# Patient Record
Sex: FEMALE | Race: WHITE | NOT HISPANIC OR LATINO | Employment: OTHER | URBAN - METROPOLITAN AREA
[De-identification: names, ages, dates, MRNs, and addresses within clinical notes are randomized per-mention and may not be internally consistent; named-entity substitution may affect disease eponyms.]

---

## 2023-01-30 ENCOUNTER — HOSPITAL ENCOUNTER (INPATIENT)
Dept: DATA CONVERSION | Facility: HOSPITAL | Age: 68
Discharge: HOME | End: 2023-02-01
Attending: THORACIC SURGERY (CARDIOTHORACIC VASCULAR SURGERY) | Admitting: THORACIC SURGERY (CARDIOTHORACIC VASCULAR SURGERY)
Payer: MEDICARE

## 2023-01-30 DIAGNOSIS — I10 ESSENTIAL (PRIMARY) HYPERTENSION: ICD-10-CM

## 2023-01-30 DIAGNOSIS — E83.42 HYPOMAGNESEMIA: ICD-10-CM

## 2023-01-30 DIAGNOSIS — Z87.891 PERSONAL HISTORY OF NICOTINE DEPENDENCE: ICD-10-CM

## 2023-01-30 DIAGNOSIS — M19.90 UNSPECIFIED OSTEOARTHRITIS, UNSPECIFIED SITE: ICD-10-CM

## 2023-01-30 DIAGNOSIS — E66.01 MORBID (SEVERE) OBESITY DUE TO EXCESS CALORIES (MULTI): ICD-10-CM

## 2023-01-30 DIAGNOSIS — J84.10 PULMONARY FIBROSIS, UNSPECIFIED (MULTI): ICD-10-CM

## 2023-01-30 DIAGNOSIS — Z53.32 THORACOSCOPIC SURGICAL PROCEDURE CONVERTED TO OPEN PROCEDURE: ICD-10-CM

## 2023-01-30 LAB
PATIENT TEMPERATURE: 37 DEGREES C
POCT ANION GAP, ARTERIAL: 11 MMOL/L (ref 10–25)
POCT BASE EXCESS, ARTERIAL: 1 MMOL/L (ref -2–3)
POCT CHLORIDE, ARTERIAL: 101 MMOL/L (ref 98–107)
POCT GLUCOSE, ARTERIAL: 163 MG/DL (ref 74–99)
POCT HCO3, ARTERIAL: 26.6 MMOL/L (ref 22–26)
POCT HEMATOCRIT, ARTERIAL: 39 % (ref 36–46)
POCT HEMOGLOBIN, ARTERIAL: 12.9 G/DL (ref 12–16)
POCT IONIZED CALCIUM, ARTERIAL: 1.23 MMOL/L (ref 1.1–1.33)
POCT LACTATE, ARTERIAL: 1.2 MMOL/L (ref 0.4–2)
POCT OXY HEMOGLOBIN, ARTERIAL: 97.6 % (ref 94–98)
POCT PCO2, ARTERIAL: 45 MMHG (ref 38–42)
POCT PH, ARTERIAL: 7.38 (ref 7.38–7.42)
POCT PO2, ARTERIAL: 131 MMHG (ref 85–95)
POCT POTASSIUM, ARTERIAL: 4.4 MMOL/L (ref 3.5–5.3)
POCT SO2, ARTERIAL: 100 % (ref 94–100)
POCT SODIUM, ARTERIAL: 134 MMOL/L (ref 136–145)

## 2023-01-31 LAB
ACTIVATED PARTIAL THROMBOPLASTIN TIME IN PPP BY COAGULATION ASSAY: 27 SEC (ref 26–39)
ANION GAP IN SER/PLAS: 14 MMOL/L (ref 10–20)
CALCIUM (MG/DL) IN SER/PLAS: 9.7 MG/DL (ref 8.6–10.6)
CARBON DIOXIDE, TOTAL (MMOL/L) IN SER/PLAS: 29 MMOL/L (ref 21–32)
CHLORIDE (MMOL/L) IN SER/PLAS: 98 MMOL/L (ref 98–107)
CREATININE (MG/DL) IN SER/PLAS: 0.75 MG/DL (ref 0.5–1.05)
ERYTHROCYTE DISTRIBUTION WIDTH (RATIO) BY AUTOMATED COUNT: 14.3 % (ref 11.5–14.5)
ERYTHROCYTE MEAN CORPUSCULAR HEMOGLOBIN CONCENTRATION (G/DL) BY AUTOMATED: 30.9 G/DL (ref 32–36)
ERYTHROCYTE MEAN CORPUSCULAR VOLUME (FL) BY AUTOMATED COUNT: 88 FL (ref 80–100)
ERYTHROCYTES (10*6/UL) IN BLOOD BY AUTOMATED COUNT: 4.74 X10E12/L (ref 4–5.2)
GFR FEMALE: 86 ML/MIN/1.73M2
GLUCOSE (MG/DL) IN SER/PLAS: 175 MG/DL (ref 74–99)
HEMATOCRIT (%) IN BLOOD BY AUTOMATED COUNT: 41.7 % (ref 36–46)
HEMOGLOBIN (G/DL) IN BLOOD: 12.9 G/DL (ref 12–16)
INR IN PPP BY COAGULATION ASSAY: 1.1 (ref 0.9–1.1)
LEUKOCYTES (10*3/UL) IN BLOOD BY AUTOMATED COUNT: 11.8 X10E9/L (ref 4.4–11.3)
MAGNESIUM (MG/DL) IN SER/PLAS: 1.88 MG/DL (ref 1.6–2.4)
NRBC (PER 100 WBCS) BY AUTOMATED COUNT: 0 /100 WBC (ref 0–0)
PLATELETS (10*3/UL) IN BLOOD AUTOMATED COUNT: 185 X10E9/L (ref 150–450)
POTASSIUM (MMOL/L) IN SER/PLAS: 4.4 MMOL/L (ref 3.5–5.3)
PROTHROMBIN TIME (PT) IN PPP BY COAGULATION ASSAY: 12.4 SEC (ref 9.8–13.4)
SODIUM (MMOL/L) IN SER/PLAS: 137 MMOL/L (ref 136–145)
UREA NITROGEN (MG/DL) IN SER/PLAS: 13 MG/DL (ref 6–23)

## 2023-02-10 LAB
COMPLETE PATHOLOGY REPORT: NORMAL
CONVERTED CLINICAL DIAGNOSIS-HISTORY: NORMAL
CONVERTED FINAL DIAGNOSIS: NORMAL
CONVERTED FINAL REPORT PDF LINK TO COPY AND PASTE: NORMAL
CONVERTED GROSS DESCRIPTION: NORMAL
CONVERTED INTRAOPERATIVE DIAGNOSIS: NORMAL

## 2023-03-03 NOTE — H&P
History & Physical Reviewed:   I have reviewed the History and Physical dated:  30-Jan-2023   History and Physical reviewed and relevant findings noted. Patient examined to review pertinent physical  findings.: No significant changes   Home Medications Reviewed: no changes noted   Allergies Reviewed: no changes noted       ERAS (Enhanced Recovery After Surgery):  ·  ERAS Patient: no     Consent:   COVID-19 Consent:  ·  COVID-19 Risk Consent Surgeon has reviewed key risks related to the risk of sherri COVID-19 and if they contract COVID-19 what the risks are.     Attestation:   Note Completion:  I am a:  Resident/Fellow   Attending Attestation I saw and evaluated the patient.  I personally obtained the key and critical portions of the history and physical exam or was physically present for key and  critical portions performed by the resident/fellow. I reviewed the resident/fellow?s documentation and discussed the patient with the resident/fellow.  I agree with the resident/fellow?s medical decision making as documented in their note  with the exception/addition of the following:    I personally evaluated the patient on 30-Jan-2023   Comments/ Additional Findings    x          Electronic Signatures:  Sathish Cisneros (Fellow))  (Signed 30-Jan-2023 11:08)   Authored: History & Physical Reviewed, ERAS, Consent,  Note Completion  Aric Huffman)  (Signed 30-Jan-2023 14:36)   Authored: Note Completion   Co-Signer: History & Physical Reviewed, ERAS, Consent, Note Completion      Last Updated: 30-Jan-2023 14:36 by Aric Huffman)

## 2023-03-03 NOTE — DISCHARGE SUMMARY
Send Summary:   Discharge Summary Providers:  Provider Role Provider Name   · Attending Aric Huffman   · Referring Amando Ramirez   · Nurse  Practitioner Jae Virgen   · Primary Jessica Garza       Note Recipients: Jessica Garza MD - [id number]  []  Amando Ramirez MD       Discharge:    Summary:   Admission Date: .30-Jan-2023 08:34:00   Discharge Date: 01-Feb-2023   Attending Physician at Discharge: Aric Huffman   Admission Reason: lung nodule   Final Discharge Diagnoses: Lung nodule   Procedures: Date: 30-Jan-2023 16:48:00  Procedure Name: Flexible bronchoscopy, left robotic video assisted lower lobe wedge resection, mediastinal lymph node dissection, and rib blocks   Condition at Discharge: Satisfactory   Disposition at Discharge: .Home   Vital Signs:        T   P  R  BP   MAP  SpO2   Value  36.6  90  20  148/54      92%  Date/Time 2/1 5:39 2/1 5:39 2/1 5:39 2/1 5:39    2/1 5:39  Range  (36C - 37.6C )  (66 - 97 )  (17 - 20 )  (106 - 165 )/ (54 - 87 )    (92% - 97% )   As of 31-Jan-2023 09:00:00, patient is on 4 L/min of oxygen via high-flow nasal cannula.  Highest temp of 37.6 C was recorded at 1/31 10:00    Date:            Weight/Scale Type:  Height:   01-Feb-2023 01:54  111.8  kg              Hospital Course:    Pt is a 67 year old female with a past medical history of a left lower lung nodule, HTN, and OA who is s/p a flexible bronchoscopy, left robotic video assisted lower  lobe wedge resection, mediastinal lymph node dissection, and rib blocks with Dr. Huffman on 1/30/23. Intraop frozen pathology consistent with a granuloma. Postoperatively, the patient maintained a single left chest tube to waterseal. Chest tube with minimal  output and no air leak on POD#1. Chest tube subsequently removed without complications without complication, and an occlusive dressing was applied. A post pull CXR was obtained and stable.   Supplemental oxygen was weaned off and patient was saturating 92% on room air.  [id number] [facility]: Ms Aaron Umaña was seen today for nuchal translucency ultrasound  See ultrasound report under "OB Procedures" tab  Please don't hesitate to contact our office with any concerns or questions    Simin Rousseau MD Pain was well managed with oral regimen of Acetaminophen + Oxycodone.  Patient was deemed stable for discharge on 2/1/23 and was given the usual thoracic discharge instructions.  Official pathology results were pending on discharge.     Hospital day of discharge management - spent >30 minutes coordinating the discharge and counseling/educating patient and family regarding discharge instructions.        Discharge Information:    and Continuing Care:   Lab Results - Pending:    Surgical Pathology Drawn at 30-Jan-2023 15:57:00  Culture, Fungus + Fungus Stain Drawn at 30-Jan-2023 15:45:00  Culture, Acid Fast Bacilli, Misc.+ AFB Stain Drawn at 30-Jan-2023 15:45:00  Surgical Pathology Drawn at 30-Jan-2023 15:26:00  Radiology Results - Pending: Xray Chest 1 View at  01-Feb-2023 04:22:00   Discharge Instructions:    Activity:           Activity as tolerated          Continue increasing activity and using incentive spirometer, cough and deep breathe          No flying for 3 weeks          No jogging or heavy aerobics for 3 weeks          No driving while on pain medications (narcotics)          No pushing, pulling or lifting objects over 15 lbs for 3 weeks    Nutrition/Diet:           Regular    Wound Care:           Maintain occlusive dressing intact on chest tube sites for 48 hours after last chest tube removal, then remove. Apply dry band-aid if any oozing          May shower 48 hours after last chest tube removal          No swimming or bath tub until incisions well healed, 1 week          Cleanse incisions with soap and water daily. No dressing required; leave open to air. Do not use lotions, creams or tub soaks          Notify thoracic surgery of redness, increased drainage, bleeding or other problems    Infectious Disease:           PPD Status:   not given          MRSA:   no          VRE:   no          C. Diff:   no          Other Resistant Organism:   no          Isolation Type:   none    Follow Up Appointments:     Follow-Up - Chest Xray:           Physician/Dept/Service:   Chest Xray   Radiology          Scheduled Date/Time:   22-Feb-2023 10:15          Location:   [facility], [facility], 2nd Floor, Radiology Department          Phone Number:   [phone]    Follow-Up - Primary Care Physician:           Physician/Dept/Service:   Primary Care Physician   Dr. Garza          Call to Schedule in:   2 weeks    Follow-Up Appointment:           Physician/Dept/Service:   Dr. Huffman (Thoracic Surgery)          Scheduled Date/Time:   22-Feb-2023 10:45          Location:   [facility], [facility], First Floor, Desk B          Phone Number:   [phone]    Discharge Medications: Home Medication   acetaminophen 325 mg oral tablet - 2 tab(s) orally every 4 hours as needed for pain. over-the-counter   docusate sodium 100 mg oral tablet - 1 tab(s) orally 2 times a day as needed for constipation while on narcotics. over-the-counter   amLODIPine 5 mg oral tablet - 1 tab(s) oral once a day     PRN Medication   oxyCODONE 5 mg oral tablet - 1 tab(s) orally every 6 hours x 5 days, As Needed -Pain - Mod (4-6) - for breakthrough pain   zolpidem 5 mg oral tablet - 1 tab(s) orally once a day (at bedtime), As Needed     DNR Status:   ·  Code Status Code Status order at time of discharge: Full Code     Attestation:   Note Completion:  Provider/Team Pager # [phone]         Electronic Signatures:  Aurea Patrick (APRN-CNP)  (Signed 01-Feb-2023 08:32)   Authored: Send Summary, Summary Content, Ongoing Care,  DNR Status, Note Completion      Last Updated: 01-Feb-2023 08:32 by Aurea Patrick (APRN-CNP)

## 2023-03-03 NOTE — PROGRESS NOTES
Service: Thoracic & Esophageal Surgery     Subjective Data:   ROBERT PARKER is a 67 year old Female who is Hospital Day #2 and POD #1 for Flexible bronchoscopy, left robotic video assisted lower lobe wedge resection, mediastinal lymph node dissection, and rib  blocks.    Overnight Events: Patient had an uneventful night.   Additional Information:    Pt reports nausea overnight from her PCA. Otherwise, she has no concerns. Her pain is reasonably well controlled. She has ambulated in the halls with nursing. She  further denies shortness of breath, fevers, chills, or sweating, and her oxygen has been weaned to 4L/min nasal cannula. She has voided s/p godinez removal.    Objective Data:     Objective Information:      T   P  R  BP   MAP  SpO2   Value  37.6  97  17  165/86      96%  Date/Time 1/31 10:00 1/31 10:00 1/31 10:00 1/31 10:00    1/31 10:00  Range  (36.3C - 37.6C )  (78 - 97 )  (16 - 18 )  (106 - 165 )/ (61 - 86 )    (94% - 97% )   As of 31-Jan-2023 09:00:00, patient is on 4 L/min of oxygen via high-flow nasal cannula.  Highest temp of 37.6 C was recorded at 1/31 10:00      Pain reported at 1/31 6:57: 7 = Severe    ---- Intake and Output  -----  Mn/Dy/Year Time  Intake   Output  Net  Jan 31, 2023 6:00 am  530   200  330  Jan 30, 2023 10:00 pm  895   425  470    The Intake and Output Totals for the last 24 hours are:      Intake   Output  Net      [phone]    Physical Exam by System:    Constitutional: Alert and oriented x 3, NAD   Eyes: Moist mucus membranes   Head/Neck: No tracheal deviation or JVD   Respiratory/Thorax: No accessory muscle use to breathe.  On 4L/min nasal cannula. One left chest tube to atrium and watersealed with ~120cc of serosanguinous output since surgery. No air leak appreciated.   Cardiovascular: S1/S2 present and regular, rate in  the 70s on telemetry   Gastrointestinal: Protuberant abdomen   Extremities: No lower extremity edema or calf tenderness   Psychological:  Appropriate mood and behavior, very  pleasant   Skin: Surgical incisions well-approximated, clean,  dry, and intact. No incisional edema, erythema, or exudates.     Medication:    Medications:          Continuous Medications       --------------------------------  No continuous medications are active       Scheduled Medications       --------------------------------    1. Acetaminophen:  650  mg  Oral  Every 4 Hours    2. amLODIPine (NORVASC):  5  mg  Oral  Daily    3. Docusate 50 mg - Senna 8.6 m  tablet(s)  Oral  At Bedtime    4. Heparin SubCutaneous:  7500  unit(s)  SubCutaneous  Every 8 Hours         PRN Medications       --------------------------------    1. Albuterol 90 micrograms/ Inhalation MDI:  2  inhalation  Inhalation  Every 6 Hours    2. Naloxone Injectable:  0.2  mg  IntraVenous Push  Once    3. Ondansetron Injectable:  4  mg  IntraVenous Push  Every 6 Hours    4. oxyCODONE Immediate Release:  5  mg  Oral  Every 4 Hours    5. oxyCODONE Immediate Release:  10  mg  Oral  Every 4 Hours    6. Sore Throat Lozenge:  1  lozenge(s)  Oral  Every 3 Hours        Recent Lab Results:    Results:    CBC: 2023 08:17              \     Hgb     /                              \     12.9       /  WBC  ----------------  Plt               11.8 H    ----------------    185              /     Hct     \                              /     41.7       \            RBC: 4.74     MCV: 88           BMP: 2023 08:17  NA+        Cl-     BUN  /                         137    98    13  /  --------------------------------  Glucose                ---------------------------  175 H    K+     HCO3-   Creat \                         4.4  29    0.75  \  Calcium : 9.7     Anion Gap : 14      Coagulation: 2023 08:17  PT  /                    12.4  /  -------<    INR          ----------<      1.1  PTT\                    27  \                       ---------- Recent Arterial Blood Gas Results----------     2023  15:06  pO2 131  pH 7.38  pCO2 45  SO2 100  Base Excess 1.0null    Radiology Results:    Results:    Impression:  1.  Interval removal of left apical chest tube with similar-appearing  bibasilar atelectasis.  2.  Tracebilateral pleural effusions.  3. No other significant acute interval change since previous.  X-ray Chest 1 View [Jan 31 2023 11:21AM]    Impression:  1.  Left apical chest tube in place.  2. Interval improvement of aeration of bilateral lungs. Bibasilar  atelectasis.  3. Cardiomegaly, unchanged.  X-ray Chest 1 View [Jan 31 2023 11:20AM]    Impression:  1. Borderline heart size  2. Diminished pulmonary expansion.  3. Left thoracostomy tube.  4. Mild atelectasis or minimal infiltrate at either lung base.  X-ray Chest 1 View [Jan 31 2023 10:42AM]      Assessment and Plan:        Surg History:   Lung nodule: Entered Date: 26-Jan-2023 16:34    Comorbidities:  ·  Comorbidity obesity   ·  Obesity morbid obesity (BMI 40+)   ·  BMI 46.42     Code Status:  ·  Code Status Full Code     Advance Care Planning:  Advance Care Planning: I evaluated the patient  and determined the patient's capacity to understand the risks, benefits and alternatives to treatment. I elicited the patient's goals for treatment and reviewed advance directives and medical orders for life sustaining treatment. The patient was given  an opportunity to review a blank advance directive as appropriate.     Assessment:    Pt is a 67 year old female with a past medical history of a left lower lung nodule, HTN, and OA who is s/p a flexible bronchoscopy, left robotic video assisted lower  lobe wedge resection, mediastinal lymph node dissection, and rib blocks with Dr. Huffman on 1/30/23. Intraop frozen pathology consistent with a granuloma. Postoperatively, the patient maintained a single left chest tube to waterseal. Minimal serosanguinous  output and no air leak appreciated on POD#1. Chest tube was subsequently removed without complications, and an  occlusive dressing was applied. A post pull CXR was ordered.     Plan:    Neurology: Acute postop pain, well controlled   -Out of bed to the chair throughout the day  -Encourage ambulation as tolerated  -Discontinue PCA pump  -Begin oral regimen of pain control with oxycodone and Tylenol, monitor effectiveness, adjust dose as needed, OARRs checked for discharge planning, overdose risk score: 150     Cardiovascular: History of hypertension, on amlodipine at home. /86 this morning.   -Continue telemetry  -Vital signs every four hours  -Restart home amlodipine today  -Replace electrolytes as needed, hypomagnesia today, goal Mg>2, replaced    Pulmonology: History of left lower lung nodule who is s/p a flexible bronchoscopy, left robotic video assisted lower lobe wedge resection, mediastinal lymph node dissection, and rib blocks with Dr. Huffman on 1/30/23. Intraop frozen pathology consistent  with a granuloma. Minimal serosanguinous output and no air leak appreciated on POD#1.  -Encourage incentive spirometer use every hour  -Continue pulmonary hygiene  -Wean oxygen as tolerated (currently on 4L nasal cannula)   -Chest tube removed without complications, and an occlusive dressing was applied. A post pull CXR was ordered.   -Obtain daily CXR while hospitalized   -Nebulizers as needed for shortness of breath    Gastrointestinal:   -Begin regular diet  -Zofran available for nausea  -Bowel regimen available for constipation secondary to pain medication     Renal:   -Spontaneously voiding   -Continue to monitor daily electrolytes with routine BMPs     Infectious Disease: Afebrile, no leukocytosis   -Continue to trend daily temperatures and WBC count to monitor for signs of postoperative infection   -Monitor surgical incisions for signs of infection   -Perioperative Ancef completed     Hematology:   -Monitor for signs of acute blood loss  -Trend CBCs     Prophylaxis:   -Continue subcutaneous heparin and SCDs for DVT  prophylaxis    Disposition:  -Plan for discharge home once oxygen weaned and medically stable, ?late today vs. tomorrow  -Assess for home needs, pt reports good family support at home and declines need for PT/OT services   -I spent 15 minutes providing patient education on wound care, activity restrictions, and follow up care with the patient in preparation for discharge.     Patient seen and evaluated. I discussed the patient with Dr. Huffman.    Multidisciplinary Rounding:   The following staff  were in attendance attending physician and physician assistant. The following  topics were discussed during rounds activity, blood test results, diet, discharge planning, home care needs, imaging/procedure results, medications and plan of care.    Attestation:   Note Completion:  Provider/Team Pager # [phone]         Electronic Signatures:  Shadia Ramos (PAC)  (Signed 31-Jan-2023 12:48)   Authored: Service, Subjective Data, Objective Data, Assessment  and Plan, Multidisciplinary Rounding, Note Completion      Last Updated: 31-Jan-2023 12:48 by Shadia Ramos (PAC)

## 2023-03-03 NOTE — PROGRESS NOTES
Service: Thoracic & Esophageal Surgery     Subjective Data:   ROBERT PARKER is a 67 year old Female who is Hospital Day # 3 and POD #2 for Flexible bronchoscopy, left robotic video assisted lower lobe wedge resection, mediastinal lymph node dissection, and rib  blocks.    Overnight Events: Patient had an uneventful night.   Additional Information:    Pt reports sleepless night, woke up frequently mild incisional pain   oxygenating 92% on room air  ambulating hallway     Objective Data:     Objective Information:      T   P  R  BP   MAP  SpO2   Value  36.6  90  20  148/54      92%  Date/Time 2/1 5:39 2/1 5:39 2/1 5:39 2/1 5:39    2/1 5:39  Range  (36C - 37.6C )  (66 - 97 )  (17 - 20 )  (106 - 165 )/ (54 - 87 )    (92% - 97% )   As of 31-Jan-2023 09:00:00, patient is on 4 L/min of oxygen via high-flow nasal cannula.  Highest temp of 37.6 C was recorded at 1/31 10:00      Pain reported at 1/31 22:30: 5 = Moderate    ---- Intake and Output  -----  Mn/Dy/Year Time  Intake   Output  Net  Feb 1, 2023 6:00 am  0   0  0  Jan 31, 2023 10:00 pm  240   600  -360  Jan 31, 2023 2:00 pm  530   503  27    The Intake and Output Totals for the last 24 hours are:      Intake   Output  Net      770   1103  -333      T   P  R  BP   MAP  SpO2   Value  36.6  90  20  148/54      92%  Date/Time 2/1 5:39 2/1 5:39 2/1 5:39 2/1 5:39    2/1 5:39  Range  (36C - 37.6C )  (66 - 97 )  (17 - 20 )  (106 - 165 )/ (54 - 87 )    (92% - 97% )   As of 31-Jan-2023 09:00:00, patient is on 4 L/min of oxygen via high-flow nasal cannula.  Highest temp of 37.6 C was recorded at 1/31 10:00        Pain reported at 1/31 22:30: 5 = Moderate         Weights   2/1 1:54: Weight in kg (Weight (kg))  111.8  2/1 1:54: Weight in lbs ((lbs))  246.4  1/31 4:19: BMI (kg/m2) (BMI (kg/m2))  46.417      ---- Intake and Output  -----  Mn/Dy/Year Time  Intake   Output  Net  Feb 1, 2023 6:00 am  0   0  0  Jan 31, 2023 10:00 pm  240   600  -360  Jan 31, 2023 2:00  pm  530   277  27    The Intake and Output Totals for the last 24 hours are:      Intake   Output  Net      [phone] -477    Physical Exam by System:    Constitutional: Well developed, awake/alert/oriented  x3, no distress, alert and cooperative   Eyes: Moist mucus membranes   Head/Neck: No tracheal deviation or JVD   Respiratory/Thorax: oxygenating 92% on room air   diminished at bases   Cardiovascular: S1/S2 present and regular, rate in  the 70s on telemetry   Gastrointestinal: Protuberant abdomen, tolerating  diet well   Extremities: No lower extremity edema or calf tenderness   Psychological: Appropriate mood and behavior, very  pleasant   Skin: chest tube site with occlusive dressing intact  , no drainage     Medication:    Medications:          Continuous Medications       --------------------------------  No continuous medications are active       Scheduled Medications       --------------------------------    1. Acetaminophen:  650  mg  Oral  Every 4 Hours    2. amLODIPine (NORVASC):  5  mg  Oral  Daily    3. Docusate 50 mg - Senna 8.6 m  tablet(s)  Oral  At Bedtime    4. Heparin SubCutaneous:  7500  unit(s)  SubCutaneous  Every 8 Hours         PRN Medications       --------------------------------    1. Albuterol 90 micrograms/ Inhalation MDI:  2  inhalation  Inhalation  Every 6 Hours    2. Naloxone Injectable:  0.2  mg  IntraVenous Push  Once    3. Ondansetron Injectable:  4  mg  IntraVenous Push  Every 6 Hours    4. oxyCODONE Immediate Release:  5  mg  Oral  Every 4 Hours    5. oxyCODONE Immediate Release:  10  mg  Oral  Every 4 Hours    6. Sore Throat Lozenge:  1  lozenge(s)  Oral  Every 3 Hours        Recent Lab Results:    Results:    CBC: 2023 12:21              \     Hgb     /                              \     Canceled       /  WBC  ----------------  Plt               Canceled       ----------------    Canceled              /     Hct     \                              /     Canceled        \            RBC: Canceled     MCV: Canceled           BMP: 1/31/2023 12:21  NA+        Cl-     BUN  /                         Canceled    Canceled    Canceled  /  --------------------------------  Glucose                ---------------------------  Canceled    K+     HCO3-   Creat \                         Canceled  Canceled    Canceled  \  Calcium : Canceled     Anion Gap : Canceled      Coagulation: 1/31/2023 12:22  PT  /                    Canceled  /  -------<    INR          ----------<      Canceled  PTT\                    Canceled THE APTT IS NO LONGER USED FOR MONITORING     UNFRACTIONATED HEPARIN THERAPY.    FOR MONITORING HEPARIN THERAPY,     USE THE HEPARIN ASSAY.  \                       Radiology Results:    Results:    2/1/23 CXR   no sizable pneumothorax     Assessment and Plan:   Comorbidities:  ·  Comorbidity obesity   ·  Obesity morbid obesity (BMI 40+)   ·  BMI 46.42     Code Status:  ·  Code Status Full Code     Assessment:    Pt is a 67 year old female with a past medical history of a left lower lung nodule, HTN, and OA who is s/p a flexible bronchoscopy, left robotic video assisted lower  lobe wedge resection, mediastinal lymph node dissection, and rib blocks with Dr. Huffman on 1/30/23. Intraop frozen pathology consistent with a granuloma. Postoperatively, the patient maintained a single left chest tube to waterseal. Minimal serosanguinous  output and no air leak appreciated on POD#1. Chest tube was subsequently removed without complications, and an occlusive dressing was applied. A post pull CXR was stable without sizable pneumothorax.     Plan:    Neurology: Acute postop pain, well controlled   -Out of bed to the chair throughout the day  -Encourage ambulation as tolerated  -Good pain control on oral regimen of  oxycodone and Tylenol, monitor effectiveness, adjust dose as needed, OARRs checked for discharge planning, overdose risk score: 150   -Bowel regimen while on narcotics      Cardiovascular: History of hypertension, on amlodipine at home. Slightly hypertensive.   -Continue telemetry  -Vital signs every four hours  -Continue home amlodipine  -Replace electrolytes as needed    Pulmonology: History of left lower lung nodule who is s/p a flexible bronchoscopy, left robotic video assisted lower lobe wedge resection, mediastinal lymph node dissection, and rib blocks with Dr. Huffman on 1/30/23. Intraop frozen pathology consistent  with a granuloma. CT removed on POD#1.  -Encourage incentive spirometer use every hour  -Continue pulmonary hygiene  -Oxygenating 92% on room air   -Chest tube removed 1/31   -Obtain daily CXR while hospitalized   -Nebulizers as needed for shortness of breath  -Await pathology results     Gastrointestinal:   -Tolerating regular diet  -Zofran available for nausea  -Bowel regimen available for constipation secondary to pain medication     Renal:   -Spontaneously voiding   -Continue to monitor daily electrolytes with routine BMPs     Infectious Disease: Afebrile, no leukocytosis   -Continue to trend daily temperatures and WBC count to monitor for signs of postoperative infection   -Monitor surgical incisions for signs of infection   -Perioperative Ancef completed     Hematology:   -Monitor for signs of acute blood loss  -Trend CBCs     Prophylaxis:   -Continue subcutaneous heparin and SCDs for DVT prophylaxis    Disposition:  -Discharge patient home today, f/u with Dr. Huffman in 2 weeks with CXR    Patient seen and evaluated. I discussed the patient with Dr. Huffman.    Plan of Care Reviewed With:  Plan of Care Reviewed With: patient     Multidisciplinary Rounding:   The following staff  were in attendance attending physician.    Attestation:   Note Completion:  Provider/Team Pager # [phone]         Electronic Signatures:  Aurea Patrick (APRN-CNP)  (Signed 01-Feb-2023 08:08)   Authored: Service, Subjective Data, Objective Data, Assessment  and Plan,  Multidisciplinary Rounding, Note Completion      Last Updated: 01-Feb-2023 08:08 by Aurea Patrick (APRN-CNP)

## 2023-03-22 LAB
AFB CULTURE: NORMAL
AFB STAIN: NORMAL

## 2023-09-14 NOTE — PROGRESS NOTES
Service: Thoracic & Esophageal Surgery     Subjective Data:   ROBERT PARKER is a 67 year old Female who is Hospital Day # 3 and POD #2 for Flexible bronchoscopy, left robotic video assisted lower lobe wedge resection, mediastinal lymph node dissection, and rib  blocks.    Overnight Events: Patient had an uneventful night.   Additional Information:    Pt reports sleepless night, woke up frequently mild incisional pain   oxygenating 92% on room air  ambulating hallway     Objective Data:     Objective Information:      T   P  R  BP   MAP  SpO2   Value  36.6  90  20  148/54      92%  Date/Time 2/1 5:39 2/1 5:39 2/1 5:39 2/1 5:39    2/1 5:39  Range  (36C - 37.6C )  (66 - 97 )  (17 - 20 )  (106 - 165 )/ (54 - 87 )    (92% - 97% )   As of 31-Jan-2023 09:00:00, patient is on 4 L/min of oxygen via high-flow nasal cannula.  Highest temp of 37.6 C was recorded at 1/31 10:00      Pain reported at 1/31 22:30: 5 = Moderate    ---- Intake and Output  -----  Mn/Dy/Year Time  Intake   Output  Net  Feb 1, 2023 6:00 am  0   0  0  Jan 31, 2023 10:00 pm  240   600  -360  Jan 31, 2023 2:00 pm  530   503  27    The Intake and Output Totals for the last 24 hours are:      Intake   Output  Net      770   1103  -333      T   P  R  BP   MAP  SpO2   Value  36.6  90  20  148/54      92%  Date/Time 2/1 5:39 2/1 5:39 2/1 5:39 2/1 5:39    2/1 5:39  Range  (36C - 37.6C )  (66 - 97 )  (17 - 20 )  (106 - 165 )/ (54 - 87 )    (92% - 97% )   As of 31-Jan-2023 09:00:00, patient is on 4 L/min of oxygen via high-flow nasal cannula.  Highest temp of 37.6 C was recorded at 1/31 10:00        Pain reported at 1/31 22:30: 5 = Moderate         Weights   2/1 1:54: Weight in kg (Weight (kg))  111.8  2/1 1:54: Weight in lbs ((lbs))  246.4  1/31 4:19: BMI (kg/m2) (BMI (kg/m2))  46.417      ---- Intake and Output  -----  Mn/Dy/Year Time  Intake   Output  Net  Feb 1, 2023 6:00 am  0   0  0  Jan 31, 2023 10:00 pm  240   600  -360  Jan 31, 2023 2:00  pm  530   123  27    The Intake and Output Totals for the last 24 hours are:      Intake   Output  Net      449 6723 -464    Physical Exam by System:    Constitutional: Well developed, awake/alert/oriented  x3, no distress, alert and cooperative   Eyes: Moist mucus membranes   Head/Neck: No tracheal deviation or JVD   Respiratory/Thorax: oxygenating 92% on room air   diminished at bases   Cardiovascular: S1/S2 present and regular, rate in  the 70s on telemetry   Gastrointestinal: Protuberant abdomen, tolerating  diet well   Extremities: No lower extremity edema or calf tenderness   Psychological: Appropriate mood and behavior, very  pleasant   Skin: chest tube site with occlusive dressing intact  , no drainage     Medication:    Medications:          Continuous Medications       --------------------------------  No continuous medications are active       Scheduled Medications       --------------------------------    1. Acetaminophen:  650  mg  Oral  Every 4 Hours    2. amLODIPine (NORVASC):  5  mg  Oral  Daily    3. Docusate 50 mg - Senna 8.6 m  tablet(s)  Oral  At Bedtime    4. Heparin SubCutaneous:  7500  unit(s)  SubCutaneous  Every 8 Hours         PRN Medications       --------------------------------    1. Albuterol 90 micrograms/ Inhalation MDI:  2  inhalation  Inhalation  Every 6 Hours    2. Naloxone Injectable:  0.2  mg  IntraVenous Push  Once    3. Ondansetron Injectable:  4  mg  IntraVenous Push  Every 6 Hours    4. oxyCODONE Immediate Release:  5  mg  Oral  Every 4 Hours    5. oxyCODONE Immediate Release:  10  mg  Oral  Every 4 Hours    6. Sore Throat Lozenge:  1  lozenge(s)  Oral  Every 3 Hours        Recent Lab Results:    Results:    CBC: 2023 12:21              \     Hgb     /                              \     Canceled       /  WBC  ----------------  Plt               Canceled       ----------------    Canceled              /     Hct     \                              /     Canceled        \            RBC: Canceled     MCV: Canceled           BMP: 1/31/2023 12:21  NA+        Cl-     BUN  /                         Canceled    Canceled    Canceled  /  --------------------------------  Glucose                ---------------------------  Canceled    K+     HCO3-   Creat \                         Canceled  Canceled    Canceled  \  Calcium : Canceled     Anion Gap : Canceled      Coagulation: 1/31/2023 12:22  PT  /                    Canceled  /  -------<    INR          ----------<      Canceled  PTT\                    Canceled THE APTT IS NO LONGER USED FOR MONITORING     UNFRACTIONATED HEPARIN THERAPY.    FOR MONITORING HEPARIN THERAPY,     USE THE HEPARIN ASSAY.  \                       Radiology Results:    Results:    2/1/23 CXR   no sizable pneumothorax     Assessment and Plan:   Comorbidities:  ·  Comorbidity obesity   ·  Obesity morbid obesity (BMI 40+)   ·  BMI 46.42     Code Status:  ·  Code Status Full Code     Assessment:    Pt is a 67 year old female with a past medical history of a left lower lung nodule, HTN, and OA who is s/p a flexible bronchoscopy, left robotic video assisted lower  lobe wedge resection, mediastinal lymph node dissection, and rib blocks with Dr. Huffman on 1/30/23. Intraop frozen pathology consistent with a granuloma. Postoperatively, the patient maintained a single left chest tube to waterseal. Minimal serosanguinous  output and no air leak appreciated on POD#1. Chest tube was subsequently removed without complications, and an occlusive dressing was applied. A post pull CXR was stable without sizable pneumothorax.     Plan:    Neurology: Acute postop pain, well controlled   -Out of bed to the chair throughout the day  -Encourage ambulation as tolerated  -Good pain control on oral regimen of  oxycodone and Tylenol, monitor effectiveness, adjust dose as needed, OARRs checked for discharge planning, overdose risk score: 150   -Bowel regimen while on narcotics      Cardiovascular: History of hypertension, on amlodipine at home. Slightly hypertensive.   -Continue telemetry  -Vital signs every four hours  -Continue home amlodipine  -Replace electrolytes as needed    Pulmonology: History of left lower lung nodule who is s/p a flexible bronchoscopy, left robotic video assisted lower lobe wedge resection, mediastinal lymph node dissection, and rib blocks with Dr. Huffman on 1/30/23. Intraop frozen pathology consistent  with a granuloma. CT removed on POD#1.  -Encourage incentive spirometer use every hour  -Continue pulmonary hygiene  -Oxygenating 92% on room air   -Chest tube removed 1/31   -Obtain daily CXR while hospitalized   -Nebulizers as needed for shortness of breath  -Await pathology results     Gastrointestinal:   -Tolerating regular diet  -Zofran available for nausea  -Bowel regimen available for constipation secondary to pain medication     Renal:   -Spontaneously voiding   -Continue to monitor daily electrolytes with routine BMPs     Infectious Disease: Afebrile, no leukocytosis   -Continue to trend daily temperatures and WBC count to monitor for signs of postoperative infection   -Monitor surgical incisions for signs of infection   -Perioperative Ancef completed     Hematology:   -Monitor for signs of acute blood loss  -Trend CBCs     Prophylaxis:   -Continue subcutaneous heparin and SCDs for DVT prophylaxis    Disposition:  -Discharge patient home today, f/u with Dr. Huffman in 2 weeks with CXR    Patient seen and evaluated. I discussed the patient with Dr. Huffman.    Plan of Care Reviewed With:  Plan of Care Reviewed With: patient     Multidisciplinary Rounding:   The following staff  were in attendance attending physician.    Attestation:   Note Completion:  Provider/Team Pager # [phone]         Electronic Signatures:  Aurea Patrick (APRN-CNP)  (Signed 01-Feb-2023 08:08)   Authored: Service, Subjective Data, Objective Data, Assessment  and Plan,  Multidisciplinary Rounding, Note Completion      Last Updated: 01-Feb-2023 08:08 by Aurea Patrick (APRN-CNP)

## 2023-09-14 NOTE — PROGRESS NOTES
Service: Thoracic & Esophageal Surgery     Subjective Data:   ROBERT PARKER is a 67 year old Female who is Hospital Day #2 and POD #1 for Flexible bronchoscopy, left robotic video assisted lower lobe wedge resection, mediastinal lymph node dissection, and rib  blocks.    Overnight Events: Patient had an uneventful night.   Additional Information:    Pt reports nausea overnight from her PCA. Otherwise, she has no concerns. Her pain is reasonably well controlled. She has ambulated in the halls with nursing. She  further denies shortness of breath, fevers, chills, or sweating, and her oxygen has been weaned to 4L/min nasal cannula. She has voided s/p godinez removal.    Objective Data:     Objective Information:      T   P  R  BP   MAP  SpO2   Value  37.6  97  17  165/86      96%  Date/Time 1/31 10:00 1/31 10:00 1/31 10:00 1/31 10:00    1/31 10:00  Range  (36.3C - 37.6C )  (78 - 97 )  (16 - 18 )  (106 - 165 )/ (61 - 86 )    (94% - 97% )   As of 31-Jan-2023 09:00:00, patient is on 4 L/min of oxygen via high-flow nasal cannula.  Highest temp of 37.6 C was recorded at 1/31 10:00      Pain reported at 1/31 6:57: 7 = Severe    ---- Intake and Output  -----  Mn/Dy/Year Time  Intake   Output  Net  Jan 31, 2023 6:00 am  530   200  330  Jan 30, 2023 10:00 pm  895   425  470    The Intake and Output Totals for the last 24 hours are:      Intake   Output  Net      [phone]    Physical Exam by System:    Constitutional: Alert and oriented x 3, NAD   Eyes: Moist mucus membranes   Head/Neck: No tracheal deviation or JVD   Respiratory/Thorax: No accessory muscle use to breathe.  On 4L/min nasal cannula. One left chest tube to atrium and watersealed with ~120cc of serosanguinous output since surgery. No air leak appreciated.   Cardiovascular: S1/S2 present and regular, rate in  the 70s on telemetry   Gastrointestinal: Protuberant abdomen   Extremities: No lower extremity edema or calf tenderness   Psychological:  Appropriate mood and behavior, very  pleasant   Skin: Surgical incisions well-approximated, clean,  dry, and intact. No incisional edema, erythema, or exudates.     Medication:    Medications:          Continuous Medications       --------------------------------  No continuous medications are active       Scheduled Medications       --------------------------------    1. Acetaminophen:  650  mg  Oral  Every 4 Hours    2. amLODIPine (NORVASC):  5  mg  Oral  Daily    3. Docusate 50 mg - Senna 8.6 m  tablet(s)  Oral  At Bedtime    4. Heparin SubCutaneous:  7500  unit(s)  SubCutaneous  Every 8 Hours         PRN Medications       --------------------------------    1. Albuterol 90 micrograms/ Inhalation MDI:  2  inhalation  Inhalation  Every 6 Hours    2. Naloxone Injectable:  0.2  mg  IntraVenous Push  Once    3. Ondansetron Injectable:  4  mg  IntraVenous Push  Every 6 Hours    4. oxyCODONE Immediate Release:  5  mg  Oral  Every 4 Hours    5. oxyCODONE Immediate Release:  10  mg  Oral  Every 4 Hours    6. Sore Throat Lozenge:  1  lozenge(s)  Oral  Every 3 Hours        Recent Lab Results:    Results:    CBC: 2023 08:17              \     Hgb     /                              \     12.9       /  WBC  ----------------  Plt               11.8 H    ----------------    185              /     Hct     \                              /     41.7       \            RBC: 4.74     MCV: 88           BMP: 2023 08:17  NA+        Cl-     BUN  /                         137    98    13  /  --------------------------------  Glucose                ---------------------------  175 H    K+     HCO3-   Creat \                         4.4  29    0.75  \  Calcium : 9.7     Anion Gap : 14      Coagulation: 2023 08:17  PT  /                    12.4  /  -------<    INR          ----------<      1.1  PTT\                    27  \                       ---------- Recent Arterial Blood Gas Results----------     2023  15:06  pO2 131  pH 7.38  pCO2 45  SO2 100  Base Excess 1.0null    Radiology Results:    Results:    Impression:  1.  Interval removal of left apical chest tube with similar-appearing  bibasilar atelectasis.  2.  Tracebilateral pleural effusions.  3. No other significant acute interval change since previous.  X-ray Chest 1 View [Jan 31 2023 11:21AM]    Impression:  1.  Left apical chest tube in place.  2. Interval improvement of aeration of bilateral lungs. Bibasilar  atelectasis.  3. Cardiomegaly, unchanged.  X-ray Chest 1 View [Jan 31 2023 11:20AM]    Impression:  1. Borderline heart size  2. Diminished pulmonary expansion.  3. Left thoracostomy tube.  4. Mild atelectasis or minimal infiltrate at either lung base.  X-ray Chest 1 View [Jan 31 2023 10:42AM]      Assessment and Plan:        Surg History:   Lung nodule: Entered Date: 26-Jan-2023 16:34    Comorbidities:  ·  Comorbidity obesity   ·  Obesity morbid obesity (BMI 40+)   ·  BMI 46.42     Code Status:  ·  Code Status Full Code     Advance Care Planning:  Advance Care Planning: I evaluated the patient  and determined the patient's capacity to understand the risks, benefits and alternatives to treatment. I elicited the patient's goals for treatment and reviewed advance directives and medical orders for life sustaining treatment. The patient was given  an opportunity to review a blank advance directive as appropriate.     Assessment:    Pt is a 67 year old female with a past medical history of a left lower lung nodule, HTN, and OA who is s/p a flexible bronchoscopy, left robotic video assisted lower  lobe wedge resection, mediastinal lymph node dissection, and rib blocks with Dr. Huffman on 1/30/23. Intraop frozen pathology consistent with a granuloma. Postoperatively, the patient maintained a single left chest tube to waterseal. Minimal serosanguinous  output and no air leak appreciated on POD#1. Chest tube was subsequently removed without complications, and an  occlusive dressing was applied. A post pull CXR was ordered.     Plan:    Neurology: Acute postop pain, well controlled   -Out of bed to the chair throughout the day  -Encourage ambulation as tolerated  -Discontinue PCA pump  -Begin oral regimen of pain control with oxycodone and Tylenol, monitor effectiveness, adjust dose as needed, OARRs checked for discharge planning, overdose risk score: 150     Cardiovascular: History of hypertension, on amlodipine at home. /86 this morning.   -Continue telemetry  -Vital signs every four hours  -Restart home amlodipine today  -Replace electrolytes as needed, hypomagnesia today, goal Mg>2, replaced    Pulmonology: History of left lower lung nodule who is s/p a flexible bronchoscopy, left robotic video assisted lower lobe wedge resection, mediastinal lymph node dissection, and rib blocks with Dr. Huffman on 1/30/23. Intraop frozen pathology consistent  with a granuloma. Minimal serosanguinous output and no air leak appreciated on POD#1.  -Encourage incentive spirometer use every hour  -Continue pulmonary hygiene  -Wean oxygen as tolerated (currently on 4L nasal cannula)   -Chest tube removed without complications, and an occlusive dressing was applied. A post pull CXR was ordered.   -Obtain daily CXR while hospitalized   -Nebulizers as needed for shortness of breath    Gastrointestinal:   -Begin regular diet  -Zofran available for nausea  -Bowel regimen available for constipation secondary to pain medication     Renal:   -Spontaneously voiding   -Continue to monitor daily electrolytes with routine BMPs     Infectious Disease: Afebrile, no leukocytosis   -Continue to trend daily temperatures and WBC count to monitor for signs of postoperative infection   -Monitor surgical incisions for signs of infection   -Perioperative Ancef completed     Hematology:   -Monitor for signs of acute blood loss  -Trend CBCs     Prophylaxis:   -Continue subcutaneous heparin and SCDs for DVT  prophylaxis    Disposition:  -Plan for discharge home once oxygen weaned and medically stable, ?late today vs. tomorrow  -Assess for home needs, pt reports good family support at home and declines need for PT/OT services   -I spent 15 minutes providing patient education on wound care, activity restrictions, and follow up care with the patient in preparation for discharge.     Patient seen and evaluated. I discussed the patient with Dr. Huffman.    Multidisciplinary Rounding:   The following staff  were in attendance attending physician and physician assistant. The following  topics were discussed during rounds activity, blood test results, diet, discharge planning, home care needs, imaging/procedure results, medications and plan of care.    Attestation:   Note Completion:  Provider/Team Pager # [phone]         Electronic Signatures:  Shadia Ramos (PAC)  (Signed 31-Jan-2023 12:48)   Authored: Service, Subjective Data, Objective Data, Assessment  and Plan, Multidisciplinary Rounding, Note Completion      Last Updated: 31-Jan-2023 12:48 by Shadia Ramos (PAC)

## 2023-09-14 NOTE — DISCHARGE SUMMARY
Send Summary:   Discharge Summary Providers:  Provider Role Provider Name   · Attending Aric Huffman   · Referring Amando Ramirez   · Nurse  Practitioner Jae Virgen   · Primary Jessica Garza       Note Recipients: Jessica Garza MD - [id number]  []  Amando Ramirez MD       Discharge:    Summary:   Admission Date: .30-Jan-2023 08:34:00   Discharge Date: 01-Feb-2023   Attending Physician at Discharge: Aric Huffman   Admission Reason: lung nodule   Final Discharge Diagnoses: Lung nodule   Procedures: Date: 30-Jan-2023 16:48:00  Procedure Name: Flexible bronchoscopy, left robotic video assisted lower lobe wedge resection, mediastinal lymph node dissection, and rib blocks   Condition at Discharge: Satisfactory   Disposition at Discharge: .Home   Vital Signs:        T   P  R  BP   MAP  SpO2   Value  36.6  90  20  148/54      92%  Date/Time 2/1 5:39 2/1 5:39 2/1 5:39 2/1 5:39    2/1 5:39  Range  (36C - 37.6C )  (66 - 97 )  (17 - 20 )  (106 - 165 )/ (54 - 87 )    (92% - 97% )   As of 31-Jan-2023 09:00:00, patient is on 4 L/min of oxygen via high-flow nasal cannula.  Highest temp of 37.6 C was recorded at 1/31 10:00    Date:            Weight/Scale Type:  Height:   01-Feb-2023 01:54  111.8  kg              Hospital Course:    Pt is a 67 year old female with a past medical history of a left lower lung nodule, HTN, and OA who is s/p a flexible bronchoscopy, left robotic video assisted lower  lobe wedge resection, mediastinal lymph node dissection, and rib blocks with Dr. Huffman on 1/30/23. Intraop frozen pathology consistent with a granuloma. Postoperatively, the patient maintained a single left chest tube to waterseal. Chest tube with minimal  output and no air leak on POD#1. Chest tube subsequently removed without complications without complication, and an occlusive dressing was applied. A post pull CXR was obtained and stable.   Supplemental oxygen was weaned off and patient was saturating 92% on room air.  Pain was well managed with oral regimen of Acetaminophen + Oxycodone.  Patient was deemed stable for discharge on 2/1/23 and was given the usual thoracic discharge instructions.  Official pathology results were pending on discharge.     Hospital day of discharge management - spent >30 minutes coordinating the discharge and counseling/educating patient and family regarding discharge instructions.        Discharge Information:    and Continuing Care:   Lab Results - Pending:    Surgical Pathology Drawn at 30-Jan-2023 15:57:00  Culture, Fungus + Fungus Stain Drawn at 30-Jan-2023 15:45:00  Culture, Acid Fast Bacilli, Misc.+ AFB Stain Drawn at 30-Jan-2023 15:45:00  Surgical Pathology Drawn at 30-Jan-2023 15:26:00  Radiology Results - Pending: Xray Chest 1 View at  01-Feb-2023 04:22:00   Discharge Instructions:    Activity:           Activity as tolerated          Continue increasing activity and using incentive spirometer, cough and deep breathe          No flying for 3 weeks          No jogging or heavy aerobics for 3 weeks          No driving while on pain medications (narcotics)          No pushing, pulling or lifting objects over 15 lbs for 3 weeks    Nutrition/Diet:           Regular    Wound Care:           Maintain occlusive dressing intact on chest tube sites for 48 hours after last chest tube removal, then remove. Apply dry band-aid if any oozing          May shower 48 hours after last chest tube removal          No swimming or bath tub until incisions well healed, 1 week          Cleanse incisions with soap and water daily. No dressing required; leave open to air. Do not use lotions, creams or tub soaks          Notify thoracic surgery of redness, increased drainage, bleeding or other problems    Infectious Disease:           PPD Status:   not given          MRSA:   no          VRE:   no          C. Diff:   no          Other Resistant Organism:   no          Isolation Type:   none    Follow Up Appointments:     Follow-Up - Chest Xray:           Physician/Dept/Service:   Chest Xray   Radiology          Scheduled Date/Time:   22-Feb-2023 10:15          Location:   [facility], [facility], 2nd Floor, Radiology Department          Phone Number:   [phone]    Follow-Up - Primary Care Physician:           Physician/Dept/Service:   Primary Care Physician   Dr. Garza          Call to Schedule in:   2 weeks    Follow-Up Appointment:           Physician/Dept/Service:   Dr. Huffman (Thoracic Surgery)          Scheduled Date/Time:   22-Feb-2023 10:45          Location:   [facility], [facility], First Floor, Desk B          Phone Number:   [phone]    Discharge Medications: Home Medication   acetaminophen 325 mg oral tablet - 2 tab(s) orally every 4 hours as needed for pain. over-the-counter   docusate sodium 100 mg oral tablet - 1 tab(s) orally 2 times a day as needed for constipation while on narcotics. over-the-counter   amLODIPine 5 mg oral tablet - 1 tab(s) oral once a day     PRN Medication   oxyCODONE 5 mg oral tablet - 1 tab(s) orally every 6 hours x 5 days, As Needed -Pain - Mod (4-6) - for breakthrough pain   zolpidem 5 mg oral tablet - 1 tab(s) orally once a day (at bedtime), As Needed     DNR Status:   ·  Code Status Code Status order at time of discharge: Full Code     Attestation:   Note Completion:  Provider/Team Pager # [phone]         Electronic Signatures:  Aurea Patrick (APRN-CNP)  (Signed 01-Feb-2023 08:32)   Authored: Send Summary, Summary Content, Ongoing Care,  DNR Status, Note Completion      Last Updated: 01-Feb-2023 08:32 by Aurea Patrick (APRN-CNP)

## 2023-10-02 NOTE — OP NOTE
Post Operative Note:     PreOp Diagnosis: Lung Nodule   Post-Procedure Diagnosis: Granuloma   Procedure: Flexible bronchoscopy, left robotic video  assisted lower lobe wedge resection, mediastinal lymph node dissection, and rib blocks   Surgeon: Dr. Aric Huffman   Resident/Fellow/Other Assistant: Shadia Ramos PA-C, Kai Cisneros MD   Anesthesia: GETA   I.V. Fluids: 500mL crystalloids   Estimated Blood Loss (mL): 10mL   Specimen: yes. Left lower lobe wedge resection and  lymph nodes 7,8,9, and 10   Complications: None   Findings: Lower lobe wedge resection frozen pathology  consistent with a granuloma. Lymph nodes 8 and 9 negative for malignancy.   Patient Returned To/Condition: PACU in stable condition   Drains and/or Catheters: Left chest tube to waterseal.  Walker removed at the end of the case.     Operative Report Dictated:  Dictation: yes   Dictated by: Dr. Huffman   Date of Dictation: 30-Jan-2023     Attestation:   Note Completion:  Provider/Team Pager # [phone]   I am a: Advanced Practice Provider   Attending Only - Shared Visit with Advanced Practice Provider This is a shared visit.  I have reviewed the Advanced Practice Provider?s encounter note, approve the Advanced Practice Provider?s documentation,  and provide the following additional information from my personal encounter.    Attending Attestation I was present for key portions of the procedure and the procedure lasted longer than 5 minutes.    Comments/ Additional Findings    x          Electronic Signatures:  Aric Huffman)  (Signed 31-Jan-2023 13:31)   Authored: Note Completion   Co-Signer: Post Operative Note, Note Completion  Shadia Ramos (PAC)  (Signed 30-Jan-2023 16:53)   Authored: Post Operative Note, Note Completion      Last Updated: 31-Jan-2023 13:31 by Aric Huffman)

## 2023-11-07 DIAGNOSIS — I10 ESSENTIAL HYPERTENSION: Primary | ICD-10-CM

## 2023-11-09 RX ORDER — HYDROCHLOROTHIAZIDE 12.5 MG/1
12.5 TABLET ORAL
Qty: 90 TABLET | Refills: 3 | Status: SHIPPED | OUTPATIENT
Start: 2023-11-09

## 2023-11-09 RX ORDER — AMLODIPINE BESYLATE 5 MG/1
5 TABLET ORAL DAILY
Qty: 90 TABLET | Refills: 3 | Status: SHIPPED | OUTPATIENT
Start: 2023-11-09

## 2023-11-16 ENCOUNTER — OFFICE VISIT (OUTPATIENT)
Dept: PRIMARY CARE | Facility: CLINIC | Age: 68
End: 2023-11-16
Payer: MEDICARE

## 2023-11-16 VITALS
WEIGHT: 242 LBS | SYSTOLIC BLOOD PRESSURE: 140 MMHG | HEIGHT: 63 IN | DIASTOLIC BLOOD PRESSURE: 90 MMHG | HEART RATE: 78 BPM | BODY MASS INDEX: 42.88 KG/M2 | OXYGEN SATURATION: 96 % | RESPIRATION RATE: 16 BRPM

## 2023-11-16 DIAGNOSIS — Z00.00 ROUTINE GENERAL MEDICAL EXAMINATION AT A HEALTH CARE FACILITY: Primary | ICD-10-CM

## 2023-11-16 DIAGNOSIS — Z12.31 SCREENING MAMMOGRAM FOR BREAST CANCER: ICD-10-CM

## 2023-11-16 DIAGNOSIS — Z23 IMMUNIZATION DUE: ICD-10-CM

## 2023-11-16 DIAGNOSIS — I10 PRIMARY HYPERTENSION: ICD-10-CM

## 2023-11-16 DIAGNOSIS — E11.9 TYPE 2 DIABETES MELLITUS WITHOUT COMPLICATION, WITHOUT LONG-TERM CURRENT USE OF INSULIN (MULTI): ICD-10-CM

## 2023-11-16 DIAGNOSIS — F51.01 PRIMARY INSOMNIA: ICD-10-CM

## 2023-11-16 DIAGNOSIS — Z13.29 SCREENING FOR HYPOTHYROIDISM: ICD-10-CM

## 2023-11-16 DIAGNOSIS — E78.2 MIXED HYPERLIPIDEMIA: ICD-10-CM

## 2023-11-16 DIAGNOSIS — Z79.899 ENCOUNTER FOR LONG-TERM (CURRENT) USE OF MEDICATIONS: ICD-10-CM

## 2023-11-16 PROCEDURE — 1159F MED LIST DOCD IN RCRD: CPT | Performed by: INTERNAL MEDICINE

## 2023-11-16 PROCEDURE — 3008F BODY MASS INDEX DOCD: CPT | Performed by: INTERNAL MEDICINE

## 2023-11-16 PROCEDURE — G0439 PPPS, SUBSEQ VISIT: HCPCS | Mod: 25 | Performed by: INTERNAL MEDICINE

## 2023-11-16 PROCEDURE — 1126F AMNT PAIN NOTED NONE PRSNT: CPT | Performed by: INTERNAL MEDICINE

## 2023-11-16 PROCEDURE — 3077F SYST BP >= 140 MM HG: CPT | Performed by: INTERNAL MEDICINE

## 2023-11-16 PROCEDURE — 1036F TOBACCO NON-USER: CPT | Performed by: INTERNAL MEDICINE

## 2023-11-16 PROCEDURE — G0008 ADMIN INFLUENZA VIRUS VAC: HCPCS | Performed by: INTERNAL MEDICINE

## 2023-11-16 PROCEDURE — 3080F DIAST BP >= 90 MM HG: CPT | Performed by: INTERNAL MEDICINE

## 2023-11-16 PROCEDURE — G0009 ADMIN PNEUMOCOCCAL VACCINE: HCPCS | Performed by: INTERNAL MEDICINE

## 2023-11-16 PROCEDURE — G0439 PPPS, SUBSEQ VISIT: HCPCS | Performed by: INTERNAL MEDICINE

## 2023-11-16 RX ORDER — TIRZEPATIDE 2.5 MG/.5ML
5 INJECTION, SOLUTION SUBCUTANEOUS
Qty: 3 ML | Refills: 11 | Status: SHIPPED | OUTPATIENT
Start: 2023-11-16 | End: 2024-02-27 | Stop reason: DRUGHIGH

## 2023-11-16 RX ORDER — TIRZEPATIDE 2.5 MG/.5ML
2.5 INJECTION, SOLUTION SUBCUTANEOUS
Qty: 3 ML | Refills: 0 | Status: SHIPPED | OUTPATIENT
Start: 2023-11-16 | End: 2024-02-27 | Stop reason: DRUGHIGH

## 2023-11-16 RX ORDER — ZOLPIDEM TARTRATE 10 MG/1
5 TABLET ORAL NIGHTLY PRN
Qty: 30 TABLET | Refills: 5 | Status: SHIPPED | OUTPATIENT
Start: 2023-11-16 | End: 2024-01-15

## 2023-11-16 ASSESSMENT — ENCOUNTER SYMPTOMS
DIFFICULTY URINATING: 0
NAUSEA: 0
DIARRHEA: 0
COUGH: 0
LOSS OF SENSATION IN FEET: 0
SINUS PAIN: 0
ABDOMINAL PAIN: 0
HEMATURIA: 0
CHILLS: 0
FATIGUE: 0
DIZZINESS: 0
OCCASIONAL FEELINGS OF UNSTEADINESS: 0
SORE THROAT: 0
SHORTNESS OF BREATH: 0
APPETITE CHANGE: 0
JOINT SWELLING: 0
SLEEP DISTURBANCE: 1
WEAKNESS: 0
FEVER: 0
FREQUENCY: 0
NERVOUS/ANXIOUS: 0
ARTHRALGIAS: 0
VOMITING: 0
RHINORRHEA: 0
DEPRESSION: 0
HEADACHES: 0
CONSTIPATION: 0
PALPITATIONS: 0

## 2023-11-16 ASSESSMENT — PATIENT HEALTH QUESTIONNAIRE - PHQ9
SUM OF ALL RESPONSES TO PHQ9 QUESTIONS 1 AND 2: 0
1. LITTLE INTEREST OR PLEASURE IN DOING THINGS: NOT AT ALL
2. FEELING DOWN, DEPRESSED OR HOPELESS: NOT AT ALL

## 2023-11-16 ASSESSMENT — PAIN SCALES - GENERAL: PAINLEVEL: 0-NO PAIN

## 2023-11-16 NOTE — PROGRESS NOTES
"Subjective   Patient ID: Olivia Pruett is a 68 y.o. female who presents for routine medical exam. Overall she is feeling well and denies any concerns. She went back to work 2 mornings a week. Has trouble sleeping at night and takes Ambien two nights a week. No longer takes water pill due to urinary frequency. She increased her exercise by using stationary bike. Aware she is due for mammogram. Received both doses of Shingrix.    Diagnostics Reviewed: 2021 DEXA nml.  Labs Reviewed:         Review of Systems   Constitutional:  Negative for appetite change, chills, fatigue and fever.   HENT:  Negative for ear pain, rhinorrhea, sinus pain and sore throat.    Eyes:  Negative for visual disturbance.   Respiratory:  Negative for cough and shortness of breath.    Cardiovascular:  Negative for chest pain and palpitations.   Gastrointestinal:  Negative for abdominal pain, constipation, diarrhea, nausea and vomiting.   Genitourinary:  Negative for difficulty urinating, frequency and hematuria.   Musculoskeletal:  Negative for arthralgias and joint swelling.   Skin:  Negative for rash.   Neurological:  Negative for dizziness, weakness and headaches.   Psychiatric/Behavioral:  Positive for sleep disturbance. The patient is not nervous/anxious.        Objective   /90   Pulse 78   Resp 16   Ht 1.6 m (5' 3\")   Wt 110 kg (242 lb)   SpO2 96%   BMI 42.87 kg/m²     Physical Exam  HENT:      Right Ear: Tympanic membrane normal.      Left Ear: Tympanic membrane normal.      Mouth/Throat:      Pharynx: Oropharynx is clear. No oropharyngeal exudate.   Eyes:      Conjunctiva/sclera: Conjunctivae normal.      Pupils: Pupils are equal, round, and reactive to light.   Neck:      Thyroid: No thyromegaly.      Vascular: No carotid bruit.   Cardiovascular:      Rate and Rhythm: Regular rhythm.      Heart sounds: Normal heart sounds.   Pulmonary:      Breath sounds: Normal breath sounds.   Chest:   Breasts:     Right: Normal. No mass or " tenderness.      Left: Normal. No mass or tenderness.   Abdominal:      Palpations: Abdomen is soft. There is no hepatomegaly.      Tenderness: There is no abdominal tenderness.   Musculoskeletal:      Right hip: Normal.      Left hip: Normal.      Right knee: Normal.      Left knee: Normal.   Feet:      Comments: Plantar fasciitis  Lymphadenopathy:      Cervical: No cervical adenopathy.   Skin:     General: Skin is warm.      Comments: No suspicious moles on back, lipoma on L lower leg   Neurological:      Mental Status: She is alert and oriented to person, place, and time.      Motor: Motor function is intact.      Coordination: Coordination is intact.      Gait: Gait is intact.   Psychiatric:         Mood and Affect: Mood and affect normal.         Behavior: Behavior normal. Behavior is cooperative.         Cognition and Memory: Cognition normal.         Assessment/Plan   Diagnoses and all orders for this visit:  Immunization due  -     Flu vaccine, quadrivalent, high-dose, preservative free, age 65y+ (FLUZONE)  Primary hypertension  Mixed hyperlipidemia  Screening for hypothyroidism  Type 2 diabetes mellitus without complication, without long-term current use of insulin (CMS/[facility])  Routine general medical examination at a health care facility  Screening mammogram for breast cancer  Primary insomnia      Scribe Attestation  By signing my name below, Krupa NUNO, Scribkeli   attest that this documentation has been prepared under the direction and in the presence of Jessica Garza MD.

## 2023-12-04 ENCOUNTER — LAB (OUTPATIENT)
Dept: LAB | Facility: LAB | Age: 68
End: 2023-12-04
Payer: MEDICARE

## 2023-12-04 DIAGNOSIS — Z23 IMMUNIZATION DUE: ICD-10-CM

## 2023-12-04 DIAGNOSIS — F51.01 PRIMARY INSOMNIA: ICD-10-CM

## 2023-12-04 DIAGNOSIS — Z13.29 SCREENING FOR HYPOTHYROIDISM: ICD-10-CM

## 2023-12-04 DIAGNOSIS — Z00.00 ROUTINE GENERAL MEDICAL EXAMINATION AT A HEALTH CARE FACILITY: ICD-10-CM

## 2023-12-04 DIAGNOSIS — Z12.31 SCREENING MAMMOGRAM FOR BREAST CANCER: ICD-10-CM

## 2023-12-04 DIAGNOSIS — E78.2 MIXED HYPERLIPIDEMIA: ICD-10-CM

## 2023-12-04 DIAGNOSIS — I10 PRIMARY HYPERTENSION: ICD-10-CM

## 2023-12-04 DIAGNOSIS — Z79.899 ENCOUNTER FOR LONG-TERM (CURRENT) USE OF MEDICATIONS: ICD-10-CM

## 2023-12-04 DIAGNOSIS — E11.9 TYPE 2 DIABETES MELLITUS WITHOUT COMPLICATION, WITHOUT LONG-TERM CURRENT USE OF INSULIN (MULTI): ICD-10-CM

## 2023-12-04 LAB
ALBUMIN SERPL-MCNC: 4.6 G/DL (ref 3.5–5)
ALP BLD-CCNC: 81 U/L (ref 35–125)
ALT SERPL-CCNC: 29 U/L (ref 5–40)
AMPHETAMINES UR QL SCN>1000 NG/ML: NEGATIVE
ANION GAP SERPL CALC-SCNC: 15 MMOL/L
AST SERPL-CCNC: 19 U/L (ref 5–40)
BARBITURATES UR QL SCN>300 NG/ML: NEGATIVE
BASOPHILS # BLD AUTO: 0.08 X10*3/UL (ref 0–0.1)
BASOPHILS NFR BLD AUTO: 1.1 %
BENZODIAZ UR QL SCN>300 NG/ML: NEGATIVE
BILIRUB SERPL-MCNC: 0.3 MG/DL (ref 0.1–1.2)
BUN SERPL-MCNC: 12 MG/DL (ref 8–25)
BZE UR QL SCN>300 NG/ML: NEGATIVE
CALCIUM SERPL-MCNC: 10.1 MG/DL (ref 8.5–10.4)
CANNABINOIDS UR QL SCN>50 NG/ML: NEGATIVE
CHLORIDE SERPL-SCNC: 101 MMOL/L (ref 97–107)
CHOLEST SERPL-MCNC: 201 MG/DL (ref 133–200)
CHOLEST/HDLC SERPL: 2 {RATIO}
CO2 SERPL-SCNC: 24 MMOL/L (ref 24–31)
CREAT SERPL-MCNC: 0.7 MG/DL (ref 0.4–1.6)
EOSINOPHIL # BLD AUTO: 0.16 X10*3/UL (ref 0–0.7)
EOSINOPHIL NFR BLD AUTO: 2.2 %
ERYTHROCYTE [DISTWIDTH] IN BLOOD BY AUTOMATED COUNT: 14.8 % (ref 11.5–14.5)
EST. AVERAGE GLUCOSE BLD GHB EST-MCNC: 189 MG/DL
FENTANYL+NORFENTANYL UR QL SCN: NEGATIVE
GFR SERPL CREATININE-BSD FRML MDRD: >90 ML/MIN/1.73M*2
GLUCOSE SERPL-MCNC: 113 MG/DL (ref 65–99)
HBA1C MFR BLD: 8.2 %
HCT VFR BLD AUTO: 44.2 % (ref 36–46)
HDLC SERPL-MCNC: 99 MG/DL
HGB BLD-MCNC: 13.8 G/DL (ref 12–16)
IMM GRANULOCYTES # BLD AUTO: 0.02 X10*3/UL (ref 0–0.7)
IMM GRANULOCYTES NFR BLD AUTO: 0.3 % (ref 0–0.9)
LDLC SERPL CALC-MCNC: 87 MG/DL (ref 65–130)
LYMPHOCYTES # BLD AUTO: 2.4 X10*3/UL (ref 1.2–4.8)
LYMPHOCYTES NFR BLD AUTO: 32.7 %
MCH RBC QN AUTO: 27.3 PG (ref 26–34)
MCHC RBC AUTO-ENTMCNC: 31.2 G/DL (ref 32–36)
MCV RBC AUTO: 87 FL (ref 80–100)
METHADONE UR QL SCN>300 NG/ML: NEGATIVE
MONOCYTES # BLD AUTO: 0.77 X10*3/UL (ref 0.1–1)
MONOCYTES NFR BLD AUTO: 10.5 %
NEUTROPHILS # BLD AUTO: 3.92 X10*3/UL (ref 1.2–7.7)
NEUTROPHILS NFR BLD AUTO: 53.2 %
NRBC BLD-RTO: 0 /100 WBCS (ref 0–0)
OPIATES UR QL SCN>300 NG/ML: NEGATIVE
OXYCODONE UR QL: NEGATIVE
PCP UR QL SCN>25 NG/ML: NEGATIVE
PLATELET # BLD AUTO: 201 X10*3/UL (ref 150–450)
POTASSIUM SERPL-SCNC: 4.5 MMOL/L (ref 3.4–5.1)
PROT SERPL-MCNC: 7.4 G/DL (ref 5.9–7.9)
RBC # BLD AUTO: 5.06 X10*6/UL (ref 4–5.2)
SODIUM SERPL-SCNC: 140 MMOL/L (ref 133–145)
TRIGL SERPL-MCNC: 76 MG/DL (ref 40–150)
TSH SERPL DL<=0.05 MIU/L-ACNC: 1.49 MIU/L (ref 0.27–4.2)
WBC # BLD AUTO: 7.4 X10*3/UL (ref 4.4–11.3)

## 2023-12-04 PROCEDURE — 80307 DRUG TEST PRSMV CHEM ANLYZR: CPT

## 2023-12-04 PROCEDURE — 36415 COLL VENOUS BLD VENIPUNCTURE: CPT

## 2023-12-04 PROCEDURE — 80053 COMPREHEN METABOLIC PANEL: CPT

## 2023-12-04 PROCEDURE — 85025 COMPLETE CBC W/AUTO DIFF WBC: CPT

## 2023-12-04 PROCEDURE — 84443 ASSAY THYROID STIM HORMONE: CPT

## 2023-12-04 PROCEDURE — 80061 LIPID PANEL: CPT

## 2023-12-04 PROCEDURE — 83036 HEMOGLOBIN GLYCOSYLATED A1C: CPT

## 2023-12-14 DIAGNOSIS — E11.9 TYPE 2 DIABETES MELLITUS WITHOUT COMPLICATION, WITHOUT LONG-TERM CURRENT USE OF INSULIN (MULTI): Primary | ICD-10-CM

## 2023-12-14 RX ORDER — TIRZEPATIDE 5 MG/.5ML
5 INJECTION, SOLUTION SUBCUTANEOUS
Qty: 6 ML | Refills: 3 | Status: SHIPPED | OUTPATIENT
Start: 2023-12-14 | End: 2024-02-27 | Stop reason: DRUGHIGH

## 2024-02-27 DIAGNOSIS — E11.9 DIABETES MELLITUS WITHOUT COMPLICATION (MULTI): Primary | ICD-10-CM

## 2024-02-27 RX ORDER — TIRZEPATIDE 7.5 MG/.5ML
7.5 INJECTION, SOLUTION SUBCUTANEOUS
Qty: 6 ML | Refills: 3 | Status: SHIPPED | OUTPATIENT
Start: 2024-02-27 | End: 2024-04-22 | Stop reason: SDUPTHER

## 2024-04-19 NOTE — OP NOTE
PREOPERATIVE DIAGNOSIS:  Lung nodule.    POSTOPERATIVE DIAGNOSIS:  Necrotizing granuloma.    OPERATION/PROCEDURE:  1. Bronchoscopy.   2. Left VATS Robotic  wedge resection.   3. Left VATS Robotic  mediastinal lymph node dissection.    SURGEON:  Aric Huffman MD.    ASSISTANT(S):  Shadia Ramos.    ANESTHESIA:  General.    INDICATIONS:  The patient is a female, former smoker, who had a nodule found on CT  scan that was new as compared to 2017.  This was followed, however,  did not resolve.  She was advised to undergo wedge resection for  definitive diagnosis.     PROCEDURE IN DETAIL:  After satisfactory induction of general endotracheal tube anesthesia,  bronchoscopy was performed which was normal in anatomy and showed no  endobronchial lesions.  The patient was placed in a right lateral  decubitus position, and was prepped and draped in usual sterile  fashion.  An 8 mm incision was made at approximately 8th interspace  midaxillary line.  The camera was placed under direct VisiView port.  Additional 12 mm port was placed anteriorly.  Two additional 8 mm  ports were placed posteriorly and a 12 mm AirSeal port was placed  anteromedially.  Thoracoscopy of the chest revealed normal-appearing  pleural surfaces.  The inferior pulmonary ligament was lysed.  Level  8 and 9 lymph nodes under these were harvested.  The nodules seen on  the undersurface of the lower lobe using successive applications of  intermediate thick tissue staplers, this was wedged free, placed in a  bag and sent for frozen section.  While waiting for frozen section,  the posterior pleural reflection was lysed.  A level 7 lymph node  dissection was performed.  The posterior aspect of the left ongoing  pulmonary artery was dissected free.  The lung returned negative for  cancer and showed necrotizing granulomas.  Cultures were ordered.  Inspection was made for hemostasis, which was good.  The ports were  removed under direct vision.  No  bleeding was seen.  Marcaine was  injected.  A 28 straight chest tube with an extra sidehole was placed  posteroapically and was secured to the skin using #1 Prolene.  The  lung inflated well.  The remaining incisions were closed using a deep  layer of Vicryl, skin layer with Monocryl.  The patient was awakened,  extubated, brought to recovery room in stable condition.       Aric Huffman MD    DD:  01/30/2023 16:04:04 EST  DT:  01/31/2023 07:49:42 EST  DICTATION NUMBER:  [id number]  INTERNAL JOB NUMBER:  [id number]    CC:  Aric Huffman MD, Fax: [phone]  FARTUN MÁRQUEZ       Electronic Signatures:  Aric Huffman) (Signed on 31-Jan-2023 13:23)   Authored   Unsigned, Draft (SYS GENERATED) (Entered on 31-Jan-2023 07:49)   Entered     Last Updated: 31-Jan-2023 13:23 by Aric Huffman)

## 2024-04-22 DIAGNOSIS — E11.9 DIABETES MELLITUS WITHOUT COMPLICATION (MULTI): Primary | ICD-10-CM

## 2024-04-22 RX ORDER — TIRZEPATIDE 7.5 MG/.5ML
7.5 INJECTION, SOLUTION SUBCUTANEOUS
Qty: 6 ML | Refills: 3 | Status: SHIPPED | OUTPATIENT
Start: 2024-04-22

## 2024-05-16 ENCOUNTER — APPOINTMENT (OUTPATIENT)
Dept: PRIMARY CARE | Facility: CLINIC | Age: 69
End: 2024-05-16
Payer: MEDICARE

## 2024-05-30 ENCOUNTER — OFFICE VISIT (OUTPATIENT)
Dept: PRIMARY CARE | Facility: CLINIC | Age: 69
End: 2024-05-30
Payer: MEDICARE

## 2024-05-30 DIAGNOSIS — I10 PRIMARY HYPERTENSION: ICD-10-CM

## 2024-05-30 DIAGNOSIS — E11.9 TYPE 2 DIABETES MELLITUS WITHOUT COMPLICATION, WITHOUT LONG-TERM CURRENT USE OF INSULIN (MULTI): Primary | ICD-10-CM

## 2024-05-30 PROCEDURE — 99214 OFFICE O/P EST MOD 30 MIN: CPT | Performed by: INTERNAL MEDICINE

## 2024-05-30 PROCEDURE — 1157F ADVNC CARE PLAN IN RCRD: CPT | Performed by: INTERNAL MEDICINE

## 2024-05-30 ASSESSMENT — ENCOUNTER SYMPTOMS
NAUSEA: 0
DIARRHEA: 0
DEPRESSION: 0
ABDOMINAL PAIN: 0
COUGH: 0
CONSTIPATION: 0
SHORTNESS OF BREATH: 0
OCCASIONAL FEELINGS OF UNSTEADINESS: 0
LOSS OF SENSATION IN FEET: 0
PALPITATIONS: 0
DIZZINESS: 0
ARTHRALGIAS: 0

## 2024-05-30 ASSESSMENT — PATIENT HEALTH QUESTIONNAIRE - PHQ9
SUM OF ALL RESPONSES TO PHQ9 QUESTIONS 1 AND 2: 0
2. FEELING DOWN, DEPRESSED OR HOPELESS: NOT AT ALL
1. LITTLE INTEREST OR PLEASURE IN DOING THINGS: NOT AT ALL

## 2024-05-30 NOTE — PROGRESS NOTES
Subjective   Patient ID: Olivia Pruett is a 68 y.o. female who presents for 6 month check up. Overall she is feeling well. Patient reports recent intentional weight loss of about 30 lbs. She is walking more often and exercising daily.    Diagnostics Reviewed: 1/2024 mammogram nml. 8/2021 DEXA was nml, due 2026  Labs Reviewed: 12/2023 A1c 8.2    Today's weight 217 lbs, /70, HR 87, pulseox 96% RA    Review of Systems   Respiratory:  Negative for cough and shortness of breath.    Cardiovascular:  Negative for chest pain and palpitations.   Gastrointestinal:  Negative for abdominal pain, constipation, diarrhea and nausea.   Musculoskeletal:  Negative for arthralgias.   Neurological:  Negative for dizziness.       Objective   There were no vitals taken for this visit.    Physical Exam  Cardiovascular:      Rate and Rhythm: Normal rate and regular rhythm.      Heart sounds: Normal heart sounds.   Pulmonary:      Breath sounds: Normal breath sounds.   Abdominal:      Palpations: Abdomen is soft. There is no hepatomegaly.      Tenderness: There is no abdominal tenderness.   Musculoskeletal:      Right lower leg: No edema.      Left lower leg: No edema.   Neurological:      Mental Status: She is alert and oriented to person, place, and time.      Gait: Gait normal.   Psychiatric:         Mood and Affect: Mood normal.         Behavior: Behavior normal.         Assessment/Plan   Diagnoses and all orders for this visit:  Type 2 diabetes mellitus without complication, without long-term current use of insulin (Multi)  -     CBC and Auto Differential; Future  -     Comprehensive Metabolic Panel; Future  -     Hemoglobin A1C; Future  Primary hypertension      Scribe Attestation  By signing my name below, IKrupa, Scribkeli   attest that this documentation has been prepared under the direction and in the presence of Jessica Garza MD.

## 2024-06-10 ENCOUNTER — LAB (OUTPATIENT)
Dept: LAB | Facility: LAB | Age: 69
End: 2024-06-10
Payer: MEDICARE

## 2024-06-10 DIAGNOSIS — E11.9 TYPE 2 DIABETES MELLITUS WITHOUT COMPLICATION, WITHOUT LONG-TERM CURRENT USE OF INSULIN (MULTI): ICD-10-CM

## 2024-06-10 LAB
ALBUMIN SERPL-MCNC: 4.2 G/DL (ref 3.5–5)
ALP BLD-CCNC: 74 U/L (ref 35–125)
ALT SERPL-CCNC: 16 U/L (ref 5–40)
ANION GAP SERPL CALC-SCNC: 12 MMOL/L
AST SERPL-CCNC: 15 U/L (ref 5–40)
BASOPHILS # BLD AUTO: 0.07 X10*3/UL (ref 0–0.1)
BASOPHILS NFR BLD AUTO: 1.2 %
BILIRUB SERPL-MCNC: 0.3 MG/DL (ref 0.1–1.2)
BUN SERPL-MCNC: 9 MG/DL (ref 8–25)
CALCIUM SERPL-MCNC: 9.6 MG/DL (ref 8.5–10.4)
CHLORIDE SERPL-SCNC: 102 MMOL/L (ref 97–107)
CO2 SERPL-SCNC: 26 MMOL/L (ref 24–31)
CREAT SERPL-MCNC: 0.8 MG/DL (ref 0.4–1.6)
EGFRCR SERPLBLD CKD-EPI 2021: 79 ML/MIN/1.73M*2
EOSINOPHIL # BLD AUTO: 0.13 X10*3/UL (ref 0–0.7)
EOSINOPHIL NFR BLD AUTO: 2.2 %
ERYTHROCYTE [DISTWIDTH] IN BLOOD BY AUTOMATED COUNT: 15.3 % (ref 11.5–14.5)
EST. AVERAGE GLUCOSE BLD GHB EST-MCNC: 117 MG/DL
GLUCOSE SERPL-MCNC: 120 MG/DL (ref 65–99)
HBA1C MFR BLD: 5.7 %
HCT VFR BLD AUTO: 43.6 % (ref 36–46)
HGB BLD-MCNC: 13.7 G/DL (ref 12–16)
IMM GRANULOCYTES # BLD AUTO: 0.02 X10*3/UL (ref 0–0.7)
IMM GRANULOCYTES NFR BLD AUTO: 0.3 % (ref 0–0.9)
LYMPHOCYTES # BLD AUTO: 1.87 X10*3/UL (ref 1.2–4.8)
LYMPHOCYTES NFR BLD AUTO: 32.2 %
MCH RBC QN AUTO: 27.3 PG (ref 26–34)
MCHC RBC AUTO-ENTMCNC: 31.4 G/DL (ref 32–36)
MCV RBC AUTO: 87 FL (ref 80–100)
MONOCYTES # BLD AUTO: 0.47 X10*3/UL (ref 0.1–1)
MONOCYTES NFR BLD AUTO: 8.1 %
NEUTROPHILS # BLD AUTO: 3.25 X10*3/UL (ref 1.2–7.7)
NEUTROPHILS NFR BLD AUTO: 56 %
NRBC BLD-RTO: 0 /100 WBCS (ref 0–0)
PLATELET # BLD AUTO: 180 X10*3/UL (ref 150–450)
POTASSIUM SERPL-SCNC: 4.5 MMOL/L (ref 3.4–5.1)
PROT SERPL-MCNC: 7.2 G/DL (ref 5.9–7.9)
RBC # BLD AUTO: 5.01 X10*6/UL (ref 4–5.2)
SODIUM SERPL-SCNC: 140 MMOL/L (ref 133–145)
WBC # BLD AUTO: 5.8 X10*3/UL (ref 4.4–11.3)

## 2024-06-10 PROCEDURE — 36415 COLL VENOUS BLD VENIPUNCTURE: CPT

## 2024-06-10 PROCEDURE — 83036 HEMOGLOBIN GLYCOSYLATED A1C: CPT

## 2024-06-10 PROCEDURE — 80053 COMPREHEN METABOLIC PANEL: CPT

## 2024-06-10 PROCEDURE — 85025 COMPLETE CBC W/AUTO DIFF WBC: CPT

## 2024-11-20 ENCOUNTER — OFFICE VISIT (OUTPATIENT)
Dept: PRIMARY CARE | Facility: CLINIC | Age: 69
End: 2024-11-20
Payer: MEDICARE

## 2024-11-20 VITALS
DIASTOLIC BLOOD PRESSURE: 92 MMHG | RESPIRATION RATE: 16 BRPM | WEIGHT: 216 LBS | SYSTOLIC BLOOD PRESSURE: 144 MMHG | BODY MASS INDEX: 38.27 KG/M2 | OXYGEN SATURATION: 98 % | HEART RATE: 78 BPM | HEIGHT: 63 IN

## 2024-11-20 DIAGNOSIS — Z00.00 ROUTINE GENERAL MEDICAL EXAMINATION AT A HEALTH CARE FACILITY: Primary | ICD-10-CM

## 2024-11-20 DIAGNOSIS — E11.9 TYPE 2 DIABETES MELLITUS WITHOUT COMPLICATION, WITHOUT LONG-TERM CURRENT USE OF INSULIN (MULTI): ICD-10-CM

## 2024-11-20 DIAGNOSIS — Z12.31 SCREENING MAMMOGRAM FOR BREAST CANCER: ICD-10-CM

## 2024-11-20 DIAGNOSIS — E78.2 MIXED HYPERLIPIDEMIA: ICD-10-CM

## 2024-11-20 DIAGNOSIS — I10 PRIMARY HYPERTENSION: ICD-10-CM

## 2024-11-20 PROCEDURE — 1125F AMNT PAIN NOTED PAIN PRSNT: CPT | Performed by: INTERNAL MEDICINE

## 2024-11-20 PROCEDURE — 3008F BODY MASS INDEX DOCD: CPT | Performed by: INTERNAL MEDICINE

## 2024-11-20 PROCEDURE — 3061F NEG MICROALBUMINURIA REV: CPT | Performed by: INTERNAL MEDICINE

## 2024-11-20 PROCEDURE — 3048F LDL-C <100 MG/DL: CPT | Performed by: INTERNAL MEDICINE

## 2024-11-20 PROCEDURE — 99397 PER PM REEVAL EST PAT 65+ YR: CPT | Mod: 25 | Performed by: INTERNAL MEDICINE

## 2024-11-20 PROCEDURE — 1157F ADVNC CARE PLAN IN RCRD: CPT | Performed by: INTERNAL MEDICINE

## 2024-11-20 PROCEDURE — 3080F DIAST BP >= 90 MM HG: CPT | Performed by: INTERNAL MEDICINE

## 2024-11-20 PROCEDURE — 3044F HG A1C LEVEL LT 7.0%: CPT | Performed by: INTERNAL MEDICINE

## 2024-11-20 PROCEDURE — 1159F MED LIST DOCD IN RCRD: CPT | Performed by: INTERNAL MEDICINE

## 2024-11-20 PROCEDURE — 4010F ACE/ARB THERAPY RXD/TAKEN: CPT | Performed by: INTERNAL MEDICINE

## 2024-11-20 PROCEDURE — 99397 PER PM REEVAL EST PAT 65+ YR: CPT | Performed by: INTERNAL MEDICINE

## 2024-11-20 PROCEDURE — G0439 PPPS, SUBSEQ VISIT: HCPCS | Performed by: INTERNAL MEDICINE

## 2024-11-20 PROCEDURE — 3077F SYST BP >= 140 MM HG: CPT | Performed by: INTERNAL MEDICINE

## 2024-11-20 PROCEDURE — 99215 OFFICE O/P EST HI 40 MIN: CPT | Mod: 25 | Performed by: INTERNAL MEDICINE

## 2024-11-20 RX ORDER — ROSUVASTATIN CALCIUM 5 MG/1
5 TABLET, COATED ORAL DAILY
Qty: 100 TABLET | Refills: 3 | Status: SHIPPED | OUTPATIENT
Start: 2024-11-20 | End: 2025-12-25

## 2024-11-20 RX ORDER — LOSARTAN POTASSIUM 25 MG/1
25 TABLET ORAL DAILY
Qty: 100 TABLET | Refills: 3 | Status: SHIPPED | OUTPATIENT
Start: 2024-11-20 | End: 2025-12-25

## 2024-11-20 RX ORDER — DAPAGLIFLOZIN 5 MG/1
5 TABLET, FILM COATED ORAL DAILY
Qty: 100 TABLET | Refills: 3 | Status: SHIPPED | OUTPATIENT
Start: 2024-11-20 | End: 2025-12-25

## 2024-11-20 ASSESSMENT — PAIN SCALES - GENERAL: PAINLEVEL_OUTOF10: 6

## 2024-11-20 ASSESSMENT — ENCOUNTER SYMPTOMS
DIFFICULTY URINATING: 0
VOMITING: 0
HEADACHES: 0
FEVER: 0
CHILLS: 0
FATIGUE: 0
JOINT SWELLING: 0
NERVOUS/ANXIOUS: 1
NAUSEA: 0
OCCASIONAL FEELINGS OF UNSTEADINESS: 0
SHORTNESS OF BREATH: 0
COUGH: 0
SORE THROAT: 0
PALPITATIONS: 0
LOSS OF SENSATION IN FEET: 0
APPETITE CHANGE: 0
DYSURIA: 0
DEPRESSION: 0
DIARRHEA: 0
SLEEP DISTURBANCE: 0
RHINORRHEA: 0
DIZZINESS: 0
WEAKNESS: 0
HEMATURIA: 0
ABDOMINAL DISTENTION: 0
FREQUENCY: 0
BACK PAIN: 0
SINUS PAIN: 0
CONSTIPATION: 0
ARTHRALGIAS: 1
ABDOMINAL PAIN: 0

## 2024-11-20 NOTE — PROGRESS NOTES
"Subjective   Patient ID: Olivia Pruett is a 69 y.o. female who presents for Annual Exam.    Patient is doing well overall but has been under some mild stress due to life stress factors. Pshx of knee arthroplasty. Today, her metatarsals have been painful. Uses her treadmill multiple times a week for 45 minutes. Follows a healthy diet and avoids starches. Energy levels have been good. Counseled on low calorie diet and regular exercise. Is up to date on shingrix, TDAP, pneumonia and RSV vaccines.     Diagnostics Reviewed: 11/29/2021 Colonoscopy: revealed diverticulosis.      1/25/2024 BI Mammo Bilateral: revealed almost entirely fatty breasts.   Labs Reviewed: 12/4/2023 Lipid Panel: cholesterol 201.         12/4/2023 Hemoglobin A1C: 8.2.        6/10/2024 Hemoglobin A1C: 5.7.          Review of Systems   Constitutional:  Negative for appetite change, chills, fatigue and fever.   HENT:  Negative for ear pain, rhinorrhea, sinus pain and sore throat.    Eyes:  Negative for visual disturbance.   Respiratory:  Negative for cough and shortness of breath.    Cardiovascular:  Negative for chest pain and palpitations.   Gastrointestinal:  Negative for abdominal distention, abdominal pain, constipation, diarrhea, nausea and vomiting.   Genitourinary:  Negative for difficulty urinating, dysuria, frequency and hematuria.   Musculoskeletal:  Positive for arthralgias (metatarsals and knees). Negative for back pain and joint swelling.   Skin:  Negative for rash.   Neurological:  Negative for dizziness, weakness and headaches.   Psychiatric/Behavioral:  Negative for behavioral problems and sleep disturbance. The patient is nervous/anxious (mild).      Objective   BP (!) 144/92   Pulse 78   Resp 16   Ht 1.6 m (5' 3\")   Wt 98 kg (216 lb)   SpO2 98%   BMI 38.26 kg/m²     Physical Exam  HENT:      Right Ear: Tympanic membrane normal.      Left Ear: Tympanic membrane normal.      Mouth/Throat:      Pharynx: Oropharynx is clear. No " oropharyngeal exudate.   Eyes:      Conjunctiva/sclera: Conjunctivae normal.      Pupils: Pupils are equal, round, and reactive to light.   Neck:      Thyroid: No thyromegaly.      Vascular: No carotid bruit.   Cardiovascular:      Rate and Rhythm: Regular rhythm.      Heart sounds: Normal heart sounds.   Pulmonary:      Breath sounds: Normal breath sounds.   Abdominal:      Palpations: Abdomen is soft. There is no hepatomegaly.      Tenderness: There is no abdominal tenderness.   Musculoskeletal:      Right lower leg: No edema.      Left lower leg: No edema.   Lymphadenopathy:      Cervical: No cervical adenopathy.   Skin:     General: Skin is warm.      Comments: No suspicious moles   Neurological:      Mental Status: She is alert and oriented to person, place, and time.      Gait: Gait is intact.   Psychiatric:         Mood and Affect: Mood and affect normal.         Behavior: Behavior normal. Behavior is cooperative.         Cognition and Memory: Cognition normal.       Assessment/Plan   Diagnoses and all orders for this visit:  Routine general medical examination at a health care facility  -     Comprehensive Metabolic Panel; Future  -     Hemoglobin A1C; Future  -     Lipid Panel; Future  -     Albumin-Creatinine Ratio, Urine Random; Future  Type 2 diabetes mellitus without complication, without long-term current use of insulin (Multi)  -     Comprehensive Metabolic Panel; Future  -     Hemoglobin A1C; Future  -     Lipid Panel; Future  -     Albumin-Creatinine Ratio, Urine Random; Future  -     dapagliflozin propanediol (Farxiga) 5 mg; Take 1 tablet (5 mg) by mouth once daily.  Mixed hyperlipidemia  -     Comprehensive Metabolic Panel; Future  -     Hemoglobin A1C; Future  -     Lipid Panel; Future  -     Albumin-Creatinine Ratio, Urine Random; Future  -     rosuvastatin (Crestor) 5 mg tablet; Take 1 tablet (5 mg) by mouth once daily.  Primary hypertension  -     Comprehensive Metabolic Panel; Future  -      Hemoglobin A1C; Future  -     Lipid Panel; Future  -     Albumin-Creatinine Ratio, Urine Random; Future  -     losartan (Cozaar) 25 mg tablet; Take 1 tablet (25 mg) by mouth once daily.  Screening mammogram for breast cancer  -     BI mammo bilateral screening tomosynthesis  -     Comprehensive Metabolic Panel; Future  -     Hemoglobin A1C; Future  -     Lipid Panel; Future  -     Albumin-Creatinine Ratio, Urine Random; Future      Scribe Attestation  By signing my name below, I, Hakeem Gupta   attest that this documentation has been prepared under the direction and in the presence of Jessica Garza MD.

## 2024-11-27 ENCOUNTER — LAB (OUTPATIENT)
Dept: LAB | Facility: LAB | Age: 69
End: 2024-11-27
Payer: MEDICARE

## 2024-12-09 ENCOUNTER — LAB (OUTPATIENT)
Dept: LAB | Facility: LAB | Age: 69
End: 2024-12-09
Payer: MEDICARE

## 2024-12-09 DIAGNOSIS — I10 PRIMARY HYPERTENSION: ICD-10-CM

## 2024-12-09 DIAGNOSIS — E11.9 TYPE 2 DIABETES MELLITUS WITHOUT COMPLICATION, WITHOUT LONG-TERM CURRENT USE OF INSULIN (MULTI): ICD-10-CM

## 2024-12-09 DIAGNOSIS — Z12.31 SCREENING MAMMOGRAM FOR BREAST CANCER: ICD-10-CM

## 2024-12-09 DIAGNOSIS — E78.2 MIXED HYPERLIPIDEMIA: ICD-10-CM

## 2024-12-09 DIAGNOSIS — Z00.00 ROUTINE GENERAL MEDICAL EXAMINATION AT A HEALTH CARE FACILITY: ICD-10-CM

## 2024-12-09 LAB
ALBUMIN SERPL BCP-MCNC: 4.1 G/DL (ref 3.4–5)
ALP SERPL-CCNC: 69 U/L (ref 33–136)
ALT SERPL W P-5'-P-CCNC: 12 U/L (ref 7–45)
ANION GAP SERPL CALCULATED.3IONS-SCNC: 10 MMOL/L (ref 10–20)
AST SERPL W P-5'-P-CCNC: 12 U/L (ref 9–39)
BILIRUB SERPL-MCNC: 0.4 MG/DL (ref 0–1.2)
BUN SERPL-MCNC: 14 MG/DL (ref 6–23)
CALCIUM SERPL-MCNC: 9.1 MG/DL (ref 8.6–10.3)
CHLORIDE SERPL-SCNC: 107 MMOL/L (ref 98–107)
CHOLEST SERPL-MCNC: 187 MG/DL (ref 0–199)
CHOLEST/HDLC SERPL: 2.1 {RATIO}
CO2 SERPL-SCNC: 26 MMOL/L (ref 21–32)
CREAT SERPL-MCNC: 0.69 MG/DL (ref 0.5–1.05)
CREAT UR-MCNC: 138.4 MG/DL (ref 20–320)
EGFRCR SERPLBLD CKD-EPI 2021: >90 ML/MIN/1.73M*2
EST. AVERAGE GLUCOSE BLD GHB EST-MCNC: 114 MG/DL
GLUCOSE SERPL-MCNC: 90 MG/DL (ref 74–99)
HBA1C MFR BLD: 5.6 %
HDLC SERPL-MCNC: 88.5 MG/DL
LDLC SERPL CALC-MCNC: 78 MG/DL
MICROALBUMIN UR-MCNC: 12.4 MG/L
MICROALBUMIN/CREAT UR: 9 UG/MG CREAT
NON HDL CHOLESTEROL: 99 MG/DL (ref 0–149)
POTASSIUM SERPL-SCNC: 3.8 MMOL/L (ref 3.5–5.3)
PROT SERPL-MCNC: 6.8 G/DL (ref 6.4–8.2)
SODIUM SERPL-SCNC: 139 MMOL/L (ref 136–145)
TRIGL SERPL-MCNC: 105 MG/DL (ref 0–149)
VLDL: 21 MG/DL (ref 0–40)

## 2024-12-09 PROCEDURE — 82570 ASSAY OF URINE CREATININE: CPT

## 2024-12-09 PROCEDURE — 82043 UR ALBUMIN QUANTITATIVE: CPT

## 2024-12-09 PROCEDURE — 80053 COMPREHEN METABOLIC PANEL: CPT

## 2024-12-09 PROCEDURE — 83036 HEMOGLOBIN GLYCOSYLATED A1C: CPT

## 2024-12-09 PROCEDURE — 80061 LIPID PANEL: CPT

## 2024-12-09 PROCEDURE — 36415 COLL VENOUS BLD VENIPUNCTURE: CPT

## 2025-06-18 ENCOUNTER — APPOINTMENT (OUTPATIENT)
Dept: PRIMARY CARE | Facility: CLINIC | Age: 70
End: 2025-06-18
Payer: MEDICARE